# Patient Record
Sex: MALE | Race: WHITE | HISPANIC OR LATINO | Employment: FULL TIME | URBAN - METROPOLITAN AREA
[De-identification: names, ages, dates, MRNs, and addresses within clinical notes are randomized per-mention and may not be internally consistent; named-entity substitution may affect disease eponyms.]

---

## 2017-04-20 ENCOUNTER — OFFICE VISIT (OUTPATIENT)
Dept: FAMILY MEDICINE | Facility: CLINIC | Age: 57
End: 2017-04-20
Payer: COMMERCIAL

## 2017-04-20 VITALS — HEART RATE: 92 BPM | HEIGHT: 65 IN | DIASTOLIC BLOOD PRESSURE: 78 MMHG | SYSTOLIC BLOOD PRESSURE: 128 MMHG

## 2017-04-20 DIAGNOSIS — S39.012A LUMBAR STRAIN, INITIAL ENCOUNTER: Primary | ICD-10-CM

## 2017-04-20 PROCEDURE — 99213 OFFICE O/P EST LOW 20 MIN: CPT | Mod: S$GLB,,, | Performed by: FAMILY MEDICINE

## 2017-04-20 PROCEDURE — 99999 PR PBB SHADOW E&M-NEW PATIENT-LVL I: CPT | Mod: PBBFAC,,, | Performed by: FAMILY MEDICINE

## 2017-04-20 RX ORDER — DICLOFENAC SODIUM 75 MG/1
75 TABLET, DELAYED RELEASE ORAL 2 TIMES DAILY
Qty: 60 TABLET | Refills: 0 | Status: SHIPPED | OUTPATIENT
Start: 2017-04-20 | End: 2017-05-20

## 2017-04-20 RX ORDER — CYCLOBENZAPRINE HCL 10 MG
10 TABLET ORAL NIGHTLY PRN
Qty: 30 TABLET | Refills: 2 | Status: SHIPPED | OUTPATIENT
Start: 2017-04-20 | End: 2017-05-20

## 2017-04-20 NOTE — PROGRESS NOTES
Subjective:       Patient ID: Jesse Pride is a 56 y.o. male.    Chief Complaint: Back Pain and Hip Pain    Pt is a 56 y.o. male who presents for evaluation and management of   Encounter Diagnosis   Name Primary?    Lumbar strain, initial encounter Yes   .3 days. Right lower back pain after rotating to his right with a heavy load in his arms   Worse in the morning and at night. Better if he moves around     Review of Systems   Musculoskeletal: Positive for back pain.   Neurological: Negative for weakness and numbness.       Objective:      Physical Exam   Constitutional: He is oriented to person, place, and time. He appears well-developed and well-nourished.   HENT:   Head: Normocephalic and atraumatic.   Eyes: Conjunctivae are normal.   Neck: Normal range of motion. Neck supple.   Cardiovascular: Normal rate, regular rhythm and normal heart sounds.  Exam reveals no gallop.    No murmur heard.  Pulmonary/Chest: Effort normal and breath sounds normal.   Musculoskeletal: Normal range of motion. He exhibits tenderness.   ILL tenderness. Neg SLR   Neurological: He is alert and oriented to person, place, and time.   Skin: Skin is warm and dry.   Psychiatric: He has a normal mood and affect. His behavior is normal.       Assessment:       1. Lumbar strain, initial encounter        Plan:   Jesse was seen today for back pain and hip pain.    Diagnoses and all orders for this visit:    Lumbar strain, initial encounter  -     cyclobenzaprine (FLEXERIL) 10 MG tablet; Take 1 tablet (10 mg total) by mouth nightly as needed for Muscle spasms.  -     diclofenac (VOLTAREN) 75 MG EC tablet; Take 1 tablet (75 mg total) by mouth 2 (two) times daily.    light duty at work     No Follow-up on file.

## 2017-04-20 NOTE — MR AVS SNAPSHOT
[address] [facility] [address]  Phone: [phone]  Fax: [phone]                  Jesse Pride   2017 1:00 PM   Office Visit    Descripción:  Male : 1960   Personal Médico:  Clarence Cast MD   Departamento:  [facility]           Razón de la ralph     Back Pain     Hip Pain           Diagnósticos de Esta Visita        Comentarios    Lumbar strain, initial encounter    -  Primario            Lista de tareas           Citas próximas        Personal Médico Departamento Tfno del dpto    2017 1:00 PM Clarence Cast MD [facility] [phone]      Metas (5 Years of Data)     Ninguna      Recetas para recoger        Disp Refills Start End    cyclobenzaprine (FLEXERIL) 10 MG tablet 30 tablet 2 2017    Take 1 tablet (10 mg total) by mouth nightly as needed for Muscle spasms. - Oral    Farmacia: [facility] [address] [place] [address] No. de tlfo: #: [phone]       diclofenac (VOLTAREN) 75 MG EC tablet 60 tablet 0 2017    Take 1 tablet (75 mg total) by mouth 2 (two) times daily. - Oral    Farmacia: [place] [address] No. de tlfo: #: [phone]         [facility]     [facility] L[facility] de Enfermeras - Asistencia   Enfermeras registradas de [facility] pueden ayudarle a reservar dedrick ralph, proveer educación para la michelle, asesoría clínica, y otros servicios de asesoramiento.   Llame para deandra servicio gratuito a [phone].             Medicamentos           Mensaje sobre Medicamentos     Verificar los cambios y / o adiciones a velasquez régimen de medicación son los mismos que discutir con velasquez médico. Si cualquiera de estos cambios o adiciones son incorrectos, por favor notifique a velasquez proveedor de atención médica.        EMPEZAR a abi estos medicamentos NUEVOS        Refills    cyclobenzaprine (FLEXERIL) 10 MG tablet 2    Sig:  "Take 1 tablet (10 mg total) by mouth nightly as needed for Muscle spasms.    Categoría: Normal    Vía: Oral    diclofenac (VOLTAREN) 75 MG EC tablet 0    Sig: Take 1 tablet (75 mg total) by mouth 2 (two) times daily.    Categoría: Normal    Vía: Oral           Verifique que la siguiente lista de medicamentos es dedrick representación exacta de los medicamentos que está tomando actualmente. Si no hay ningunos reportados, la lista puede estar en maciel. Si no es correcta, por favor póngase en contacto con velasquez proveedor de atención médica. Lleve esta lista con usted en melany de emergencia.           Medicamentos Actuales     cyclobenzaprine (FLEXERIL) 10 MG tablet Take 1 tablet (10 mg total) by mouth nightly as needed for Muscle spasms.    diclofenac (VOLTAREN) 75 MG EC tablet Take 1 tablet (75 mg total) by mouth 2 (two) times daily.           Información de referencia clínica           Lavonne signos vitales stefano     PS Pulso Eunice             128/78 (BP Location: Left arm, Patient Position: Sitting, BP Method: Manual) 92 5' 5" (1.651 m)         Blood Pressure          Most Recent Value    BP  128/78      Alergias     A partir del:  4/20/2017        No Known Allergies      Vacunas     Administradas en la fecha de la visita:  4/20/2017        None      Registrarse para [facility]     La activación de velasquez cuenta [facility] es tan fácil erica 1-2-3!    1) Ir a [email or web address], seleccione Registrarse Ahora, meter el código de activación y velasquez fecha de nacimiento, y seleccione Próximo.    [id number]  Expires: 6/4/2017  2:00 PM      2) Crear un nombre de usuario y contraseña para usar cuando se visita [facility] en el futuro y selecciona dedrick pregunta de seguridad en melany de que pierda velasquez contraseña y seleccione Próximo.    3) Introduzca velasquez dirección de correo electrónico y kristen werner en Registrarse!    Información Adicional  Si tiene alguna pregunta, por favor, e-mail [email or web address] o llame al [phone] para hablar con " nuestro personal. Recuerde, [facility] no debe ser usada para necesidades urgentes. En melany de emergencia médica, llame al 911.        Language Assistance Services     ATTENTION: Language assistance services are available, free of charge. Please call [phone].      ATENCIÓN: Si habla español, tiene a velasquez disposición servicios gratuitos de asistencia lingüística. Llame al [phone].     CHÚ Ý: N?u b?n nói Ti?ng Vi?t, có các d?ch v? h? tr? ngôn ng? mi?n phí dành cho b?n. G?i s? [phone].         [facility] cumple con las leyes federales aplicables de derechos civiles y no discrimina por motivos de jayne, color, origen nacional, edad, discapacidad, o sexo.                 Jesse Pride   2017 1:00 PM   Office Visit    Description:  Male : 1960   Provider:  Clarence Cast MD   Department:  [facility]           Reason for Visit     Back Pain     Hip Pain           Diagnoses this Visit        Comments    Lumbar strain, initial encounter    -  Primary            To Do List           Future Appointments        Provider Department Dept Phone    2017 1:00 PM Clarence Cast MD [facility] [phone]      Goals     None       These Medications        Disp Refills Start End    cyclobenzaprine (FLEXERIL) 10 MG tablet 30 tablet 2 2017    Take 1 tablet (10 mg total) by mouth nightly as needed for Muscle spasms. - Oral    Pharmacy: [facility] [address] [place] [address] Ph #: [phone]       diclofenac (VOLTAREN) 75 MG EC tablet 60 tablet 0 2017    Take 1 tablet (75 mg total) by mouth 2 (two) times daily. - Oral    Pharmacy: [facility] [address] [place] [address] Ph #: [phone]         [facility]     [facility] Nurse Care Line - 24/7 Assistance  Unless otherwise directed by your provider, please contact [facility], our nurse care line that is available for  "24/7 assistance.     Registered nurses in the [facility] provide: appointment scheduling, clinical advisement, health education, and other advisory services.  Call: [phone] (toll free)               Medications           Message regarding Medications     Verify the changes and/or additions to your medication regime listed below are the same as discussed with your clinician today.  If any of these changes or additions are incorrect, please notify your healthcare provider.        START taking these NEW medications        Refills    cyclobenzaprine (FLEXERIL) 10 MG tablet 2    Sig: Take 1 tablet (10 mg total) by mouth nightly as needed for Muscle spasms.    Class: Normal    Route: Oral    diclofenac (VOLTAREN) 75 MG EC tablet 0    Sig: Take 1 tablet (75 mg total) by mouth 2 (two) times daily.    Class: Normal    Route: Oral           Verify that the below list of medications is an accurate representation of the medications you are currently taking.  If none reported, the list may be blank. If incorrect, please contact your healthcare provider. Carry this list with you in case of emergency.           Current Medications     cyclobenzaprine (FLEXERIL) 10 MG tablet Take 1 tablet (10 mg total) by mouth nightly as needed for Muscle spasms.    diclofenac (VOLTAREN) 75 MG EC tablet Take 1 tablet (75 mg total) by mouth 2 (two) times daily.           Clinical Reference Information           Your Vitals Were     BP Pulse Height             128/78 (BP Location: Left arm, Patient Position: Sitting, BP Method: Manual) 92 5' 5" (1.651 m)         Blood Pressure          Most Recent Value    BP  128/78      Allergies as of 4/20/2017     No Known Allergies      Immunizations Administered on Date of Encounter - 4/20/2017     None      [facility] Sign-Up     Activating your [facility] account is as easy as 1-2-3!     1) Visit [email or web address], select Sign Up Now, enter this activation code and your date of birth, then " select Next.  [email or web address]  Expires: 6/4/2017  2:00 PM      2) Create a username and password to use when you visit [facility] in the future and select a security question in case you lose your password and select Next.    3) Enter your e-mail address and click Sign Up!    Additional Information  If you have questions, please e-mail [email or web address] or call [phone] to talk to our [facility] staff. Remember, [facility] is NOT to be used for urgent needs. For medical emergencies, dial 911.         Language Assistance Services     ATTENTION: Language assistance services are available, free of charge. Please call [phone].      ATENCIÓN: Si habla español, tiene a velasquez disposición servicios gratuitos de asistencia lingüística. Llame al [phone].     CHÚ Ý: N?u b?n nói Ti?ng Vi?t, có các d?ch v? h? tr? ngôn ng? mi?n phí dành cho b?n. G?i s? [phone].         [facility] complies with applicable Federal civil rights laws and does not discriminate on the basis of race, color, national origin, age, disability, or sex.

## 2017-05-04 ENCOUNTER — APPOINTMENT (OUTPATIENT)
Dept: RADIOLOGY | Facility: CLINIC | Age: 57
End: 2017-05-04
Attending: FAMILY MEDICINE
Payer: COMMERCIAL

## 2017-05-04 ENCOUNTER — OFFICE VISIT (OUTPATIENT)
Dept: FAMILY MEDICINE | Facility: CLINIC | Age: 57
End: 2017-05-04
Payer: COMMERCIAL

## 2017-05-04 VITALS
DIASTOLIC BLOOD PRESSURE: 70 MMHG | WEIGHT: 171.31 LBS | SYSTOLIC BLOOD PRESSURE: 128 MMHG | HEART RATE: 88 BPM | HEIGHT: 65 IN | BODY MASS INDEX: 28.54 KG/M2

## 2017-05-04 DIAGNOSIS — M54.50 ACUTE MIDLINE LOW BACK PAIN WITHOUT SCIATICA: ICD-10-CM

## 2017-05-04 DIAGNOSIS — M54.50 ACUTE MIDLINE LOW BACK PAIN WITHOUT SCIATICA: Primary | ICD-10-CM

## 2017-05-04 PROCEDURE — 99212 OFFICE O/P EST SF 10 MIN: CPT | Mod: 25 | Performed by: FAMILY MEDICINE

## 2017-05-04 PROCEDURE — 96372 THER/PROPH/DIAG INJ SC/IM: CPT | Mod: S$GLB,,, | Performed by: FAMILY MEDICINE

## 2017-05-04 PROCEDURE — 72100 X-RAY EXAM L-S SPINE 2/3 VWS: CPT | Mod: TC,PO

## 2017-05-04 PROCEDURE — 72100 X-RAY EXAM L-S SPINE 2/3 VWS: CPT | Mod: 26,,, | Performed by: RADIOLOGY

## 2017-05-04 PROCEDURE — 99213 OFFICE O/P EST LOW 20 MIN: CPT | Mod: 25,,, | Performed by: FAMILY MEDICINE

## 2017-05-04 RX ORDER — METHYLPREDNISOLONE ACETATE 40 MG/ML
80 INJECTION, SUSPENSION INTRA-ARTICULAR; INTRALESIONAL; INTRAMUSCULAR; SOFT TISSUE
Status: COMPLETED | OUTPATIENT
Start: 2017-05-04 | End: 2017-05-04

## 2017-05-04 RX ORDER — KETOROLAC TROMETHAMINE 30 MG/ML
30 INJECTION, SOLUTION INTRAMUSCULAR; INTRAVENOUS
Status: COMPLETED | OUTPATIENT
Start: 2017-05-04 | End: 2017-05-04

## 2017-05-04 RX ADMIN — KETOROLAC TROMETHAMINE 30 MG: 30 INJECTION, SOLUTION INTRAMUSCULAR; INTRAVENOUS at 02:05

## 2017-05-04 RX ADMIN — METHYLPREDNISOLONE ACETATE 80 MG: 40 INJECTION, SUSPENSION INTRA-ARTICULAR; INTRALESIONAL; INTRAMUSCULAR; SOFT TISSUE at 02:05

## 2017-05-04 NOTE — MR AVS SNAPSHOT
[facility]  [address] [facility] [place] [address]  Phone: [phone]  Fax: [phone]                  Jesse Pride   2017 1:00 PM   Office Visit    Descripción:  Male : 1960   Personal Médico:  Clarence Cast MD   Departamento:  [facility]           Razón de la ralph     Hip Pain           Diagnósticos de Esta Visita        Comentarios    Acute midline low back pain without sciatica    -  Primario            Lista de tareas           Citas próximas        Personal Médico Departamento Tfno del dpto    2017 1:45 PM Clarence Cast MD [facility] [phone]      Metas (5 Years of Data)     Ninguna      [facility]     [facility] Línea de Enfermeras - Asistencia   Enfermeras registradas de [facility] pueden ayudarle a reservar dedrick ralph, proveer educación para la michelle, asesoría clínica, y otros servicios de asesoramiento.   Llame para deandra servicio gratuito a [phone].             Medicamentos           Mensaje sobre Medicamentos     Verificar los cambios y / o adiciones a velasquez régimen de medicación son los mismos que discutir con velasquez médico. Si cualquiera de estos cambios o adiciones son incorrectos, por favor notifique a velasquez proveedor de atención médica.        These medications were administered today        Dose Freq    methylPREDNISolone acetate injection 80 mg 80 mg Clinic/HOD 1 time    Sig: Inject 2 mLs (80 mg total) into the muscle one time.    Categoría: Normal    Vía: Intramuscular    ketorolac injection 30 mg 30 mg Clinic/HOD 1 time    Sig: Inject 1 mL (30 mg total) into the muscle one time.    Categoría: Normal    Vía: Intramuscular           Verifique que la siguiente lista de medicamentos es dedrick representación exacta de los medicamentos que está tomando actualmente. Si no hay ningunos reportados, la lista puede estar en maciel. Si no es correcta, por favor póngase en contacto con velasquez proveedor de  "atención médica. Lleve esta lista con usted en melany de emergencia.           Medicamentos Actuales     cyclobenzaprine (FLEXERIL) 10 MG tablet Take 1 tablet (10 mg total) by mouth nightly as needed for Muscle spasms.    diclofenac (VOLTAREN) 75 MG EC tablet Take 1 tablet (75 mg total) by mouth 2 (two) times daily.           Información de referencia clínica           Lavonne signos vitales stefano     PS Pulso [place] Peso BMI (IMC)       128/70 (BP Location: Left arm, Patient Position: Sitting, BP Method: Manual) 88 5' 5" (1.651 m) 77.7 kg (171 lb 4.8 oz) 28.51 kg/m2       Blood Pressure          Most Recent Value    BP  128/70      Alergias     A partir del:  5/4/2017        No Known Allergies      Vacunas     Administradas en la fecha de la visita:  5/4/2017        None      Orders Placed During Today's Visit     Exámenes/Procedimientos futuros Se espera el Vence    X-Ray Lumbar Spine AP And Lateral  5/4/2017 5/4/2018      Registrarse para [facility]     La activación de velasquez cuenta [facility] es tan fácil erica 1-2-3!    1) Ir a [email or web address], seleccione Registrarse Ahora, meter el código de activación y velasquez fecha de nacimiento, y seleccione Próximo.    [id number]  Expires: 6/4/2017  2:00 PM      2) Crear un nombre de usuario y contraseña para usar cuando se visita [facility] en el futuro y selecciona dedrick pregunta de seguridad en melany de que pierda velasquez contraseña y seleccione Próximo.    3) Introduzca velasquez dirección de correo electrónico y kristen clic en Registrarse!    Información Adicional  Si tiene alguna pregunta, por favor, e-mail [email or web address] o llame al [phone] para hablar con nuestro personal. Recuerde, [facility] no debe ser usada para necesidades urgentes. En melany de emergencia médica, llame al 911.        Language Assistance Services     ATTENTION: Language assistance services are available, free of charge. Please call [phone].      ATENCIÓN: Si habla español, tiene a velasquez disposición servicios " krishanos de asistencia lingüística. Elyse al [phone].     [facility] Ý: N?u b?n nói Ti?ng Vi?t, có các d?ch v? h? tr? ngôn ng? mi?n phí dành cho b?n. G?i s? [phone].         [facility] cumple con las leyes federales aplicables de derechos civiles y no discrimina por motivos de jayne, color, origen nacional, edad, discapacidad, o sexo.                 Jesse Pride   2017 1:00 PM   Office Visit    Description:  Male : 1960   Provider:  Clarence Cast MD   Department:  [facility]           Reason for Visit     Hip Pain           Diagnoses this Visit        Comments    Acute midline low back pain without sciatica    -  Primary            To Do List           Future Appointments        Provider Department Dept Phone    2017 1:45 PM Clarence Cast MD [facility] [phone]      Goals     None      [facility]     [facility] Nurse Care Line -  Assistance  Unless otherwise directed by your provider, please contact [facility], our nurse care line that is available for  assistance.     Registered nurses in the [facility] provide: appointment scheduling, clinical advisement, health education, and other advisory services.  Call: [phone] (toll free)               Medications           Message regarding Medications     Verify the changes and/or additions to your medication regime listed below are the same as discussed with your clinician today.  If any of these changes or additions are incorrect, please notify your healthcare provider.        These medications were administered today        Dose Freq    methylPREDNISolone acetate injection 80 mg 80 mg Clinic/HOD 1 time    Sig: Inject 2 mLs (80 mg total) into the muscle one time.    Class: Normal    Route: Intramuscular    ketorolac injection 30 mg 30 mg Clinic/HOD 1 time    Sig: Inject 1 mL (30 mg total) into the muscle one time.    Class: Normal     "Route: Intramuscular           Verify that the below list of medications is an accurate representation of the medications you are currently taking.  If none reported, the list may be blank. If incorrect, please contact your healthcare provider. Carry this list with you in case of emergency.           Current Medications     cyclobenzaprine (FLEXERIL) 10 MG tablet Take 1 tablet (10 mg total) by mouth nightly as needed for Muscle spasms.    diclofenac (VOLTAREN) 75 MG EC tablet Take 1 tablet (75 mg total) by mouth 2 (two) times daily.           Clinical Reference Information           Your Vitals Were     BP Pulse Height Weight BMI       128/70 (BP Location: Left arm, Patient Position: Sitting, BP Method: Manual) 88 5' 5" (1.651 m) 77.7 kg (171 lb 4.8 oz) 28.51 kg/m2       Blood Pressure          Most Recent Value    BP  128/70      Allergies as of 5/4/2017     No Known Allergies      Immunizations Administered on Date of Encounter - 5/4/2017     None      Orders Placed During Today's Visit     Future Labs/Procedures Expected by Expires    X-Ray Lumbar Spine AP And Lateral  5/4/2017 5/4/2018      [facility] Sign-Up     Activating your [facility] account is as easy as 1-2-3!     1) Visit [email or web address], select Sign Up Now, enter this activation code and your date of birth, then select Next.  [email or web address]  Expires: 6/4/2017  2:00 PM      2) Create a username and password to use when you visit [facility] in the future and select a security question in case you lose your password and select Next.    3) Enter your e-mail address and click Sign Up!    Additional Information  If you have questions, please e-mail [email or web address] or call [phone] to talk to our [facility] staff. Remember, [facility] is NOT to be used for urgent needs. For medical emergencies, dial 911.         Language Assistance Services     ATTENTION: Language assistance services are available, free of charge. Please call [phone].  "     ATENCIÓN: Si habla español, tiene a velasquez disposición servicios gratuitos de asistencia lingüística. Elyse al [phone].     CHÚ Ý: N?u b?n nói Ti?ng Vi?t, có các d?ch v? h? tr? ngôn ng? mi?n phí dành cho b?n. G?i s? [phone].         [facility] complies with applicable Federal civil rights laws and does not discriminate on the basis of race, color, national origin, age, disability, or sex.

## 2017-05-04 NOTE — PROGRESS NOTES
Subjective:       Patient ID: Jesse Pride is a 56 y.o. male.    Chief Complaint: Hip Pain    Pt is a 56 y.o. male who presents for evaluation and management of   Encounter Diagnosis   Name Primary?    Acute midline low back pain without sciatica Yes   .3 days. Right lower back pain after rotating to his right with a heavy load in his arms   Worse in the morning and at night. Better if he moves around     Review of Systems   Musculoskeletal: Positive for back pain.   Neurological: Negative for weakness and numbness.       Objective:      Physical Exam   Constitutional: He is oriented to person, place, and time. He appears well-developed and well-nourished.   HENT:   Head: Normocephalic and atraumatic.   Eyes: Conjunctivae are normal.   Neck: Normal range of motion. Neck supple.   Cardiovascular: Normal rate, regular rhythm and normal heart sounds.  Exam reveals no gallop.    No murmur heard.  Pulmonary/Chest: Effort normal and breath sounds normal.   Musculoskeletal: Normal range of motion. He exhibits tenderness.    Midline lumbar TTP and ILL tenderness. Neg SLR   Neurological: He is alert and oriented to person, place, and time.   Skin: Skin is warm and dry.   Psychiatric: He has a normal mood and affect. His behavior is normal.       Assessment:       1. Acute midline low back pain without sciatica        Plan:   Jesse was seen today for hip pain.    Diagnoses and all orders for this visit:    Acute midline low back pain without sciatica  -     X-Ray Lumbar Spine AP And Lateral; Future  -     methylPREDNISolone acetate injection 80 mg; Inject 2 mLs (80 mg total) into the muscle one time.  -     ketorolac injection 30 mg; Inject 1 mL (30 mg total) into the muscle one time.    light duty at work, continue   Get a back brace     RTC 2 weeks       No Follow-up on file.

## 2017-05-18 ENCOUNTER — OFFICE VISIT (OUTPATIENT)
Dept: FAMILY MEDICINE | Facility: CLINIC | Age: 57
End: 2017-05-18

## 2017-05-18 VITALS
HEART RATE: 88 BPM | BODY MASS INDEX: 28.22 KG/M2 | WEIGHT: 169.38 LBS | SYSTOLIC BLOOD PRESSURE: 128 MMHG | HEIGHT: 65 IN | DIASTOLIC BLOOD PRESSURE: 70 MMHG

## 2017-05-18 DIAGNOSIS — M54.50 ACUTE MIDLINE LOW BACK PAIN WITHOUT SCIATICA: Primary | ICD-10-CM

## 2017-05-18 PROCEDURE — 99213 OFFICE O/P EST LOW 20 MIN: CPT | Mod: ,,, | Performed by: FAMILY MEDICINE

## 2017-05-18 NOTE — MR AVS SNAPSHOT
[facility]  [address] [facility] [place] [address]  Phone: [phone]  Fax: [phone]                  Jesse Pride   2017 1:45 PM   Office Visit    Descripción:  Male : 1960   Personal Médico:  Clarence Cast MD   Departamento:  [facility]           Razón de la ralph     Back Pain           Diagnósticos de Esta Visita        Comentarios    Acute midline low back pain without sciatica    -  Primario            Lista de tareas           Citas próximas        Personal Médico Departamento Tfno del dpto    2017 12:30 PM Clarence Cast MD [facility] [phone]      Metas (5 Years of Data)     Ninguna      Follow-Up and Disposition     Return in about 2 weeks (around 2017).    Follow-up and Disposition History      [facility] [place]     [facility] [place] [facility] - Asistencia   Enfermeras registradas de [facility] pueden ayudarle a reservar dedrick ralph, proveer educación para la michelle, asesoría clínica, y otros servicios de asesoramiento.   Llame para deandra servicio gratuito a [phone].             Medicamentos           Mensaje sobre Medicamentos     Verificar los cambios y / o adiciones a velasquez régimen de medicación son los mismos que discutir con velasquez médico. Si cualquiera de estos cambios o adiciones son incorrectos, por favor notifique a velasquez proveedor de atención médica.             Verifique que la siguiente lista de medicamentos es dedrick representación exacta de los medicamentos que está tomando actualmente. Si no hay ningunos reportados, la lista puede estar en maciel. Si no es correcta, por favor póngase en contacto con velasquez proveedor de atención médica. Lleve esta lista con usted en melany de emergencia.           Medicamentos Actuales     cyclobenzaprine (FLEXERIL) 10 MG tablet Take 1 tablet (10 mg total) by mouth nightly as needed for Muscle spasms.    diclofenac (VOLTAREN) 75 MG EC tablet Take 1 tablet (75 mg  "total) by mouth 2 (two) times daily.           Información de referencia clínica           Lavonne signos vitales stefano     PS Pulso Crosby Peso BMI (IMC)       128/70 (BP Location: Left arm, Patient Position: Sitting, BP Method: Manual) 88 5' 5" (1.651 m) 76.8 kg (169 lb 6.4 oz) 28.19 kg/m2       Blood Pressure          Most Recent Value    BP  128/70      Alergias     A partir del:  5/18/2017        No Known Allergies      Vacunas     Administradas en la fecha de la visita:  5/18/2017        None      Registrarse para [facility]     La activación de velasquez cuenta [facility] es tan fácil erica 1-2-3!    1) Ir a [email or web address], seleccione Registrarse Ahora, meter el código de activación y velasquez fecha de nacimiento, y seleccione Próximo.    [id number]  Expires: 6/4/2017  2:00 PM      2) Crear un nombre de usuario y contraseña para usar cuando se visita [facility] en el futuro y selecciona dedrick pregunta de seguridad en melany de que pierda velasquez contraseña y seleccione Próximo.    3) Introduzca velasquez dirección de correo electrónico y kristen clic en Registrarse!    Información Adicional  Si tiene alguna pregunta, por favor, e-mail [email or web address] o llame al [phone] para hablar con nuestro personal. Recuerde, [facility] no debe ser usada para necesidades urgentes. En melany de emergencia médica, llame al 911.        Language Assistance Services     ATTENTION: Language assistance services are available, free of charge. Please call [phone].      ATENCIÓN: Si habla español, tiene a velasquez disposición servicios gratuitos de asistencia lingüística. Llame al [phone].     CHÚ Ý: N?u b?n nói Ti?ng Vi?t, có các d?ch v? h? tr? ngôn ng? mi?n phí dành cho b?n. G?i s? [phone].         [facility] cumple con las leyes federales aplicables de derechos civiles y no discrimina por motivos de jayne, color, origen nacional, edad, discapacidad, o sexo.                 Jesse Pride   5/18/2017 1:45 PM   Office Visit "    Description:  Male : 1960   Provider:  Clarence Cast MD   Department:  [facility]           Reason for Visit     Back Pain           Diagnoses this Visit        Comments    Acute midline low back pain without sciatica    -  Primary            To Do List           Future Appointments        Provider Department Dept Phone    2017 12:30 PM Clarence Cast MD [facility] [phone]      Goals     None      Follow-Up and Disposition     Return in about 2 weeks (around 2017).    Follow-up and Disposition History      [facility]     [facility] Nurse Care Line -  Assistance  Unless otherwise directed by your provider, please contact [facility], our nurse care line that is available for  assistance.     Registered nurses in the [facility] provide: appointment scheduling, clinical advisement, health education, and other advisory services.  Call: [phone] (toll free)               Medications           Message regarding Medications     Verify the changes and/or additions to your medication regime listed below are the same as discussed with your clinician today.  If any of these changes or additions are incorrect, please notify your healthcare provider.             Verify that the below list of medications is an accurate representation of the medications you are currently taking.  If none reported, the list may be blank. If incorrect, please contact your healthcare provider. Carry this list with you in case of emergency.           Current Medications     cyclobenzaprine (FLEXERIL) 10 MG tablet Take 1 tablet (10 mg total) by mouth nightly as needed for Muscle spasms.    diclofenac (VOLTAREN) 75 MG EC tablet Take 1 tablet (75 mg total) by mouth 2 (two) times daily.           Clinical Reference Information           Your Vitals Were     BP Pulse Height Weight BMI       128/70 (BP Location: Left arm, Patient Position: Sitting, BP  "Method: Manual) 88 5' 5" (1.651 m) 76.8 kg (169 lb 6.4 oz) 28.19 kg/m2       Blood Pressure          Most Recent Value    BP  128/70      Allergies as of 5/18/2017     No Known Allergies      Immunizations Administered on Date of Encounter - 5/18/2017     None      [facility] Sign-Up     Activating your [facility] account is as easy as 1-2-3!     1) Visit [email or web address], select Sign Up Now, enter this activation code and your date of birth, then select Next.  [email or web address]  Expires: 6/4/2017  2:00 PM      2) Create a username and password to use when you visit [facility] in the future and select a security question in case you lose your password and select Next.    3) Enter your e-mail address and click Sign Up!    Additional Information  If you have questions, please e-mail [email or web address] or call [phone] to talk to our [facility] staff. Remember, [facility] is NOT to be used for urgent needs. For medical emergencies, dial 911.         Language Assistance Services     ATTENTION: Language assistance services are available, free of charge. Please call [phone].      ATENCIÓN: Si habla español, tiene a velasquez disposición servicios gratuitos de asistencia lingüística. Llame al [phone].     [facility] Ý: N?u b?n nói Ti?ng Vi?t, có các d?ch v? h? tr? ngôn ng? mi?n phí dành cho b?n. G?i s? [phone].         [facility] complies with applicable Federal civil rights laws and does not discriminate on the basis of race, color, national origin, age, disability, or sex.          "

## 2017-05-18 NOTE — PROGRESS NOTES
Subjective:       Patient ID: Jesse Pride is a 56 y.o. male.    Chief Complaint: Back Pain    Pt is a 56 y.o. male who presents for evaluation and management of   Encounter Diagnosis   Name Primary?    Acute midline low back pain without sciatica Yes   .initial injury was Right lower back pain after rotating to his right with a heavy load in his arms   Worse in the morning and at night. Better if he moves around     Since last visit much better. Only has pain with stretching the back, but has not lifted more than 10 pounds       Review of Systems   Musculoskeletal: Positive for back pain.   Neurological: Negative for weakness and numbness.       Objective:      Physical Exam   Constitutional: He is oriented to person, place, and time. He appears well-developed and well-nourished.   HENT:   Head: Normocephalic and atraumatic.   Eyes: Conjunctivae are normal.   Neck: Normal range of motion. Neck supple.   Cardiovascular: Normal rate, regular rhythm and normal heart sounds.  Exam reveals no gallop.    No murmur heard.  Pulmonary/Chest: Effort normal and breath sounds normal.   Musculoskeletal: Normal range of motion. He exhibits tenderness.   ILL tenderness. Neg SLR   Neurological: He is alert and oriented to person, place, and time.   Skin: Skin is warm and dry.   Psychiatric: He has a normal mood and affect. His behavior is normal.       Assessment:       1. Acute midline low back pain without sciatica        Plan:   Jesse was seen today for back pain.    Diagnoses and all orders for this visit:    Acute midline low back pain without sciatica    light duty at work    RTC 2 weeks. Anticipate full duty at that time        No Follow-up on file.

## 2017-06-01 ENCOUNTER — OFFICE VISIT (OUTPATIENT)
Dept: FAMILY MEDICINE | Facility: CLINIC | Age: 57
End: 2017-06-01
Payer: COMMERCIAL

## 2017-06-01 VITALS
SYSTOLIC BLOOD PRESSURE: 128 MMHG | RESPIRATION RATE: 18 BRPM | HEIGHT: 65 IN | BODY MASS INDEX: 28.47 KG/M2 | DIASTOLIC BLOOD PRESSURE: 80 MMHG | HEART RATE: 84 BPM | WEIGHT: 170.88 LBS

## 2017-06-01 DIAGNOSIS — S39.012D LUMBAR STRAIN, SUBSEQUENT ENCOUNTER: Primary | ICD-10-CM

## 2017-06-01 PROCEDURE — 99212 OFFICE O/P EST SF 10 MIN: CPT | Mod: S$GLB,,, | Performed by: FAMILY MEDICINE

## 2017-06-01 PROCEDURE — 99999 PR PBB SHADOW E&M-EST. PATIENT-LVL II: CPT | Mod: PBBFAC,,, | Performed by: FAMILY MEDICINE

## 2017-06-01 NOTE — PROGRESS NOTES
Subjective:       Patient ID: Jesse Pride is a 56 y.o. male.    Chief Complaint: Follow-up (follow up from back injury)    Pt is a 56 y.o. male who presents for evaluation and management of   Encounter Diagnosis   Name Primary?    Lumbar strain, subsequent encounter Yes   .initial injury was Right lower back pain after rotating to his right with a heavy load in his arms   Worse in the morning and at night. Better if he moves around     Since last visit much better.  He would like to progress to full duty         Review of Systems   Musculoskeletal: Positive for back pain.   Neurological: Negative for weakness and numbness.       Objective:      Physical Exam   Constitutional: He is oriented to person, place, and time. He appears well-developed and well-nourished.   HENT:   Head: Normocephalic and atraumatic.   Eyes: Conjunctivae are normal.   Neck: Normal range of motion. Neck supple.   Cardiovascular: Normal rate, regular rhythm and normal heart sounds.  Exam reveals no gallop.    No murmur heard.  Pulmonary/Chest: Effort normal and breath sounds normal.   Musculoskeletal: Normal range of motion. He exhibits no tenderness.   Neurological: He is alert and oriented to person, place, and time.   Skin: Skin is warm and dry.   Psychiatric: He has a normal mood and affect. His behavior is normal.       Assessment:       1. Lumbar strain, subsequent encounter        Plan:   Jesse was seen today for follow-up.    Diagnoses and all orders for this visit:    Lumbar strain, subsequent encounter    regular duty at work         No Follow-up on file.